# Patient Record
Sex: FEMALE | Race: WHITE | Employment: UNEMPLOYED
[De-identification: names, ages, dates, MRNs, and addresses within clinical notes are randomized per-mention and may not be internally consistent; named-entity substitution may affect disease eponyms.]

---

## 2017-05-25 ENCOUNTER — HOSPITAL ENCOUNTER (OUTPATIENT)
Dept: ULTRASOUND IMAGING | Facility: CLINIC | Age: 37
Discharge: HOME | End: 2017-05-25
Payer: MEDICAID

## 2017-05-25 DIAGNOSIS — Z34.81 ENCOUNTER FOR SUPERVISION OF OTHER NORMAL PREGNANCY IN FIRST TRIMESTER: ICD-10-CM

## 2017-05-25 DIAGNOSIS — Z34.01 ENCOUNTER FOR SUPERVISION OF NORMAL FIRST PREGNANCY IN FIRST TRIMESTER: ICD-10-CM

## 2017-05-25 PROCEDURE — 76817 TRANSVAGINAL US OBSTETRIC: CPT

## 2017-05-25 PROCEDURE — 76801 OB US < 14 WKS SINGLE FETUS: CPT

## 2019-02-19 ENCOUNTER — OFFICE VISIT (OUTPATIENT)
Dept: PRIMARY CARE CLINIC | Age: 39
End: 2019-02-19

## 2019-02-19 VITALS
HEART RATE: 92 BPM | BODY MASS INDEX: 41.15 KG/M2 | HEIGHT: 64 IN | SYSTOLIC BLOOD PRESSURE: 136 MMHG | WEIGHT: 241 LBS | OXYGEN SATURATION: 99 % | DIASTOLIC BLOOD PRESSURE: 78 MMHG

## 2019-02-19 DIAGNOSIS — Z13.31 POSITIVE DEPRESSION SCREENING: ICD-10-CM

## 2019-02-19 DIAGNOSIS — E28.2 PCO (POLYCYSTIC OVARIES): ICD-10-CM

## 2019-02-19 DIAGNOSIS — F32.1 CURRENT MODERATE EPISODE OF MAJOR DEPRESSIVE DISORDER WITHOUT PRIOR EPISODE (HCC): Primary | ICD-10-CM

## 2019-02-19 DIAGNOSIS — E66.01 CLASS 3 SEVERE OBESITY DUE TO EXCESS CALORIES WITHOUT SERIOUS COMORBIDITY WITH BODY MASS INDEX (BMI) OF 40.0 TO 44.9 IN ADULT (HCC): ICD-10-CM

## 2019-02-19 PROCEDURE — G8431 POS CLIN DEPRES SCRN F/U DOC: HCPCS | Performed by: INTERNAL MEDICINE

## 2019-02-19 PROCEDURE — G0444 DEPRESSION SCREEN ANNUAL: HCPCS | Performed by: INTERNAL MEDICINE

## 2019-02-19 PROCEDURE — 99204 OFFICE O/P NEW MOD 45 MIN: CPT | Performed by: INTERNAL MEDICINE

## 2019-02-19 RX ORDER — BUPROPION HYDROCHLORIDE 150 MG/1
150 TABLET ORAL EVERY MORNING
Qty: 30 TABLET | Refills: 0 | Status: SHIPPED | OUTPATIENT
Start: 2019-02-19 | End: 2019-03-19

## 2019-02-19 RX ORDER — METFORMIN HYDROCHLORIDE 500 MG/1
500 TABLET, EXTENDED RELEASE ORAL 2 TIMES DAILY WITH MEALS
Qty: 60 TABLET | Refills: 3 | Status: SHIPPED | OUTPATIENT
Start: 2019-02-19 | End: 2022-03-09

## 2019-02-19 ASSESSMENT — PATIENT HEALTH QUESTIONNAIRE - PHQ9
2. FEELING DOWN, DEPRESSED OR HOPELESS: 2
SUM OF ALL RESPONSES TO PHQ QUESTIONS 1-9: 12
4. FEELING TIRED OR HAVING LITTLE ENERGY: 1
1. LITTLE INTEREST OR PLEASURE IN DOING THINGS: 2
SUM OF ALL RESPONSES TO PHQ QUESTIONS 1-9: 12
9. THOUGHTS THAT YOU WOULD BE BETTER OFF DEAD, OR OF HURTING YOURSELF: 0
SUM OF ALL RESPONSES TO PHQ9 QUESTIONS 1 & 2: 4
10. IF YOU CHECKED OFF ANY PROBLEMS, HOW DIFFICULT HAVE THESE PROBLEMS MADE IT FOR YOU TO DO YOUR WORK, TAKE CARE OF THINGS AT HOME, OR GET ALONG WITH OTHER PEOPLE: 2
6. FEELING BAD ABOUT YOURSELF - OR THAT YOU ARE A FAILURE OR HAVE LET YOURSELF OR YOUR FAMILY DOWN: 2
7. TROUBLE CONCENTRATING ON THINGS, SUCH AS READING THE NEWSPAPER OR WATCHING TELEVISION: 2
3. TROUBLE FALLING OR STAYING ASLEEP: 1
8. MOVING OR SPEAKING SO SLOWLY THAT OTHER PEOPLE COULD HAVE NOTICED. OR THE OPPOSITE, BEING SO FIGETY OR RESTLESS THAT YOU HAVE BEEN MOVING AROUND A LOT MORE THAN USUAL: 1
5. POOR APPETITE OR OVEREATING: 1

## 2019-02-19 ASSESSMENT — ENCOUNTER SYMPTOMS
SINUS PAIN: 0
VOMITING: 0
BACK PAIN: 0
CONSTIPATION: 0
DIARRHEA: 0
SHORTNESS OF BREATH: 0
COUGH: 0
ABDOMINAL DISTENTION: 0
WHEEZING: 0
NAUSEA: 0
SINUS PRESSURE: 0
ABDOMINAL PAIN: 0

## 2019-03-19 ENCOUNTER — OFFICE VISIT (OUTPATIENT)
Dept: PRIMARY CARE CLINIC | Age: 39
End: 2019-03-19

## 2019-03-19 VITALS
BODY MASS INDEX: 40.05 KG/M2 | OXYGEN SATURATION: 99 % | HEART RATE: 93 BPM | SYSTOLIC BLOOD PRESSURE: 136 MMHG | WEIGHT: 234.6 LBS | DIASTOLIC BLOOD PRESSURE: 88 MMHG | HEIGHT: 64 IN

## 2019-03-19 DIAGNOSIS — Z13.31 POSITIVE DEPRESSION SCREENING: ICD-10-CM

## 2019-03-19 DIAGNOSIS — F32.1 CURRENT MODERATE EPISODE OF MAJOR DEPRESSIVE DISORDER WITHOUT PRIOR EPISODE (HCC): Primary | ICD-10-CM

## 2019-03-19 PROCEDURE — 99203 OFFICE O/P NEW LOW 30 MIN: CPT | Performed by: INTERNAL MEDICINE

## 2019-03-19 RX ORDER — BUPROPION HYDROCHLORIDE 300 MG/1
300 TABLET ORAL EVERY MORNING
Qty: 30 TABLET | Refills: 0 | Status: SHIPPED | OUTPATIENT
Start: 2019-03-19 | End: 2019-04-17 | Stop reason: SDUPTHER

## 2019-03-19 ASSESSMENT — PATIENT HEALTH QUESTIONNAIRE - PHQ9
SUM OF ALL RESPONSES TO PHQ QUESTIONS 1-9: 1
1. LITTLE INTEREST OR PLEASURE IN DOING THINGS: 0
SUM OF ALL RESPONSES TO PHQ9 QUESTIONS 1 & 2: 1
SUM OF ALL RESPONSES TO PHQ QUESTIONS 1-9: 1
2. FEELING DOWN, DEPRESSED OR HOPELESS: 1

## 2019-03-20 ASSESSMENT — ENCOUNTER SYMPTOMS
SINUS PAIN: 0
SHORTNESS OF BREATH: 0
BACK PAIN: 0
CONSTIPATION: 0
VOMITING: 0
ABDOMINAL DISTENTION: 0
COUGH: 0
ABDOMINAL PAIN: 0
DIARRHEA: 0
WHEEZING: 0
NAUSEA: 0
SINUS PRESSURE: 0

## 2019-04-17 DIAGNOSIS — F32.1 CURRENT MODERATE EPISODE OF MAJOR DEPRESSIVE DISORDER WITHOUT PRIOR EPISODE (HCC): ICD-10-CM

## 2019-04-18 RX ORDER — BUPROPION HYDROCHLORIDE 300 MG/1
TABLET ORAL
Qty: 30 TABLET | Refills: 5 | Status: SHIPPED | OUTPATIENT
Start: 2019-04-18 | End: 2019-11-02 | Stop reason: SDUPTHER

## 2019-09-16 ENCOUNTER — TELEPHONE (OUTPATIENT)
Dept: PRIMARY CARE CLINIC | Age: 39
End: 2019-09-16

## 2019-10-03 ENCOUNTER — OFFICE VISIT (OUTPATIENT)
Dept: PRIMARY CARE CLINIC | Age: 39
End: 2019-10-03

## 2019-10-03 VITALS
DIASTOLIC BLOOD PRESSURE: 70 MMHG | WEIGHT: 232.8 LBS | OXYGEN SATURATION: 99 % | SYSTOLIC BLOOD PRESSURE: 140 MMHG | BODY MASS INDEX: 39.96 KG/M2 | HEART RATE: 89 BPM | RESPIRATION RATE: 16 BRPM

## 2019-10-03 DIAGNOSIS — F41.1 GENERALIZED ANXIETY DISORDER: ICD-10-CM

## 2019-10-03 DIAGNOSIS — F32.1 CURRENT MODERATE EPISODE OF MAJOR DEPRESSIVE DISORDER WITHOUT PRIOR EPISODE (HCC): ICD-10-CM

## 2019-10-03 DIAGNOSIS — I10 ESSENTIAL HYPERTENSION: Primary | ICD-10-CM

## 2019-10-03 DIAGNOSIS — E66.9 CLASS 2 OBESITY WITHOUT SERIOUS COMORBIDITY WITH BODY MASS INDEX (BMI) OF 39.0 TO 39.9 IN ADULT, UNSPECIFIED OBESITY TYPE: ICD-10-CM

## 2019-10-03 PROBLEM — E66.812 CLASS 2 OBESITY WITHOUT SERIOUS COMORBIDITY WITH BODY MASS INDEX (BMI) OF 39.0 TO 39.9 IN ADULT: Status: ACTIVE | Noted: 2019-10-03

## 2019-10-03 PROCEDURE — 99214 OFFICE O/P EST MOD 30 MIN: CPT | Performed by: INTERNAL MEDICINE

## 2019-10-03 RX ORDER — AMLODIPINE BESYLATE 5 MG/1
5 TABLET ORAL DAILY
Qty: 30 TABLET | Refills: 1 | Status: SHIPPED | OUTPATIENT
Start: 2019-10-03 | End: 2019-12-02 | Stop reason: SDUPTHER

## 2019-10-03 ASSESSMENT — ENCOUNTER SYMPTOMS
WHEEZING: 0
DIARRHEA: 0
VOMITING: 0
CONSTIPATION: 0
SINUS PRESSURE: 0
ABDOMINAL DISTENTION: 0
SINUS PAIN: 0
NAUSEA: 0
SHORTNESS OF BREATH: 0
BACK PAIN: 0
ABDOMINAL PAIN: 0
COUGH: 0

## 2019-11-02 DIAGNOSIS — F32.1 CURRENT MODERATE EPISODE OF MAJOR DEPRESSIVE DISORDER WITHOUT PRIOR EPISODE (HCC): ICD-10-CM

## 2019-11-05 RX ORDER — BUPROPION HYDROCHLORIDE 300 MG/1
TABLET ORAL
Qty: 90 TABLET | Refills: 1 | Status: SHIPPED | OUTPATIENT
Start: 2019-11-05 | End: 2020-05-15

## 2019-12-02 DIAGNOSIS — I10 ESSENTIAL HYPERTENSION: ICD-10-CM

## 2019-12-03 RX ORDER — AMLODIPINE BESYLATE 5 MG/1
TABLET ORAL
Qty: 90 TABLET | Refills: 1 | Status: SHIPPED | OUTPATIENT
Start: 2019-12-03 | End: 2020-07-02

## 2020-05-15 RX ORDER — BUPROPION HYDROCHLORIDE 300 MG/1
TABLET ORAL
Qty: 90 TABLET | Refills: 0 | Status: SHIPPED | OUTPATIENT
Start: 2020-05-15 | End: 2020-08-20

## 2020-07-02 RX ORDER — AMLODIPINE BESYLATE 5 MG/1
TABLET ORAL
Qty: 90 TABLET | Refills: 0 | Status: SHIPPED | OUTPATIENT
Start: 2020-07-02 | End: 2020-11-13

## 2020-07-02 NOTE — TELEPHONE ENCOUNTER
Health Maintenance   Topic Date Due    Potassium monitoring  1980    Creatinine monitoring  1980    Varicella vaccine (1 of 2 - 2-dose childhood series) 01/20/1981    HIV screen  01/20/1995    Cervical cancer screen  01/20/2001    Lipid screen  01/20/2020    Flu vaccine (1) 09/01/2020    DTaP/Tdap/Td vaccine (2 - Td) 04/19/2026    Hepatitis A vaccine  Aged Out    Hepatitis B vaccine  Aged Out    Hib vaccine  Aged Out    Meningococcal (ACWY) vaccine  Aged Out    Pneumococcal 0-64 years Vaccine  Aged Out             (applicable per patient's age: Cancer Screenings, Depression Screening, Fall Risk Screening, Immunizations)    No results found for: LABA1C, LABMICR, LDLCHOLESTEROL, LDLCALC, AST, ALT, BUN   (goal A1C is < 7)   (goal LDL is <100) need 30-50% reduction from baseline     BP Readings from Last 3 Encounters:   10/03/19 (!) 140/70   03/19/19 136/88   02/19/19 136/78    (goal /80)      All Future Testing planned in [facility]:      Next Visit Date:  No future appointments.      Last Visit: 10/3/2019    Patient Active Problem List:     PCO (polycystic ovaries)     Insulin resistance     Current moderate episode of major depressive disorder without prior episode (HCC)     Class 2 obesity without serious comorbidity with body mass index (BMI) of 39.0 to 39.9 in adult     Generalized anxiety disorder     Essential hypertension

## 2020-08-20 RX ORDER — BUPROPION HYDROCHLORIDE 300 MG/1
TABLET ORAL
Qty: 90 TABLET | Refills: 0 | Status: SHIPPED | OUTPATIENT
Start: 2020-08-20 | End: 2020-11-13

## 2020-08-20 NOTE — TELEPHONE ENCOUNTER
Last OV 10/03/2019      Health Maintenance   Topic Date Due    Potassium monitoring  1980    Creatinine monitoring  1980    Varicella vaccine (1 of 2 - 2-dose childhood series) 01/20/1981    HIV screen  01/20/1995    Cervical cancer screen  01/20/2001    Lipid screen  01/20/2020    Flu vaccine (1) 09/01/2020    DTaP/Tdap/Td vaccine (2 - Td) 04/19/2026    Hepatitis A vaccine  Aged Out    Hepatitis B vaccine  Aged Out    Hib vaccine  Aged Out    Meningococcal (ACWY) vaccine  Aged Out    Pneumococcal 0-64 years Vaccine  Aged Out             (applicable per patient's age: Cancer Screenings, Depression Screening, Fall Risk Screening, Immunizations)    No results found for: LABA1C, LABMICR, LDLCHOLESTEROL, LDLCALC, AST, ALT, BUN   (goal A1C is < 7)   (goal LDL is <100) need 30-50% reduction from baseline     BP Readings from Last 3 Encounters:   10/03/19 (!) 140/70   03/19/19 136/88   02/19/19 136/78    (goal /80)      All Future Testing planned in [facility]:      Next Visit Date:  No future appointments.          Patient Active Problem List:     PCO (polycystic ovaries)     Insulin resistance     Current moderate episode of major depressive disorder without prior episode (HCC)     Class 2 obesity without serious comorbidity with body mass index (BMI) of 39.0 to 39.9 in adult     Generalized anxiety disorder     Essential hypertension

## 2020-11-13 RX ORDER — AMLODIPINE BESYLATE 5 MG/1
TABLET ORAL
Qty: 90 TABLET | Refills: 0 | Status: SHIPPED | OUTPATIENT
Start: 2020-11-13 | End: 2022-03-09

## 2020-11-13 RX ORDER — BUPROPION HYDROCHLORIDE 300 MG/1
TABLET ORAL
Qty: 90 TABLET | Refills: 0 | Status: SHIPPED | OUTPATIENT
Start: 2020-11-13 | End: 2022-03-09

## 2022-03-05 ENCOUNTER — APPOINTMENT (OUTPATIENT)
Dept: GENERAL RADIOLOGY | Age: 42
End: 2022-03-05

## 2022-03-05 ENCOUNTER — HOSPITAL ENCOUNTER (EMERGENCY)
Age: 42
Discharge: HOME | End: 2022-03-05
Attending: EMERGENCY MEDICINE

## 2022-03-05 VITALS
HEART RATE: 87 BPM | SYSTOLIC BLOOD PRESSURE: 140 MMHG | WEIGHT: 240 LBS | OXYGEN SATURATION: 99 % | RESPIRATION RATE: 18 BRPM | HEIGHT: 64 IN | DIASTOLIC BLOOD PRESSURE: 91 MMHG | TEMPERATURE: 98.5 F | BODY MASS INDEX: 40.97 KG/M2

## 2022-03-05 DIAGNOSIS — I10 HYPERTENSION, UNSPECIFIED TYPE: ICD-10-CM

## 2022-03-05 DIAGNOSIS — R55 NEAR SYNCOPE: Primary | ICD-10-CM

## 2022-03-05 DIAGNOSIS — R53.83 FATIGUE, UNSPECIFIED TYPE: ICD-10-CM

## 2022-03-05 LAB
ABSOLUTE EOS #: 0.2 K/UL (ref 0–0.4)
ABSOLUTE LYMPH #: 1.7 K/UL (ref 1–4.8)
ABSOLUTE MONO #: 0.5 K/UL (ref 0.1–1.2)
ANION GAP SERPL CALCULATED.3IONS-SCNC: 12 MMOL/L (ref 9–17)
BASOPHILS # BLD: 1 % (ref 0–2)
BASOPHILS ABSOLUTE: 0 K/UL (ref 0–0.2)
BUN BLDV-MCNC: 11 MG/DL (ref 6–20)
CALCIUM SERPL-MCNC: 9.9 MG/DL (ref 8.6–10.4)
CHLORIDE BLD-SCNC: 97 MMOL/L (ref 98–107)
CHP ED QC CHECK: YES
CO2: 25 MMOL/L (ref 20–31)
CREAT SERPL-MCNC: 0.59 MG/DL (ref 0.5–0.9)
EOSINOPHILS RELATIVE PERCENT: 3 % (ref 1–4)
GFR AFRICAN AMERICAN: >60 ML/MIN
GFR NON-AFRICAN AMERICAN: >60 ML/MIN
GFR SERPL CREATININE-BSD FRML MDRD: ABNORMAL ML/MIN/{1.73_M2}
GLUCOSE BLD-MCNC: 75 MG/DL
GLUCOSE BLD-MCNC: 75 MG/DL (ref 65–105)
GLUCOSE BLD-MCNC: 91 MG/DL (ref 70–99)
HCG QUALITATIVE: NEGATIVE
HCT VFR BLD CALC: 40.3 % (ref 36–46)
HEMOGLOBIN: 13.6 G/DL (ref 12–16)
LYMPHOCYTES # BLD: 24 % (ref 24–44)
MAGNESIUM: 1.9 MG/DL (ref 1.6–2.6)
MCH RBC QN AUTO: 28.2 PG (ref 26–34)
MCHC RBC AUTO-ENTMCNC: 33.6 G/DL (ref 31–37)
MCV RBC AUTO: 83.9 FL (ref 80–100)
MONOCYTES # BLD: 7 % (ref 2–11)
PDW BLD-RTO: 13.2 % (ref 12.5–15.4)
PLATELET # BLD: 163 K/UL (ref 140–450)
PMV BLD AUTO: 10.9 FL (ref 6–12)
POTASSIUM SERPL-SCNC: 4.1 MMOL/L (ref 3.7–5.3)
RBC # BLD: 4.81 M/UL (ref 4–5.2)
SEG NEUTROPHILS: 65 % (ref 36–66)
SEGMENTED NEUTROPHILS ABSOLUTE COUNT: 4.6 K/UL (ref 1.8–7.7)
SODIUM BLD-SCNC: 134 MMOL/L (ref 135–144)
TROPONIN, HIGH SENSITIVITY: 6 NG/L (ref 0–14)
TROPONIN, HIGH SENSITIVITY: 7 NG/L (ref 0–14)
WBC # BLD: 7 K/UL (ref 3.5–11)

## 2022-03-05 PROCEDURE — 93005 ELECTROCARDIOGRAM TRACING: CPT | Performed by: PHYSICIAN ASSISTANT

## 2022-03-05 PROCEDURE — 71045 X-RAY EXAM CHEST 1 VIEW: CPT

## 2022-03-05 PROCEDURE — 85025 COMPLETE CBC W/AUTO DIFF WBC: CPT

## 2022-03-05 PROCEDURE — 96374 THER/PROPH/DIAG INJ IV PUSH: CPT

## 2022-03-05 PROCEDURE — 99285 EMERGENCY DEPT VISIT HI MDM: CPT

## 2022-03-05 PROCEDURE — 2580000003 HC RX 258: Performed by: PHYSICIAN ASSISTANT

## 2022-03-05 PROCEDURE — 82947 ASSAY GLUCOSE BLOOD QUANT: CPT

## 2022-03-05 PROCEDURE — 36415 COLL VENOUS BLD VENIPUNCTURE: CPT

## 2022-03-05 PROCEDURE — 84703 CHORIONIC GONADOTROPIN ASSAY: CPT

## 2022-03-05 PROCEDURE — 6360000002 HC RX W HCPCS: Performed by: PHYSICIAN ASSISTANT

## 2022-03-05 PROCEDURE — 96361 HYDRATE IV INFUSION ADD-ON: CPT

## 2022-03-05 PROCEDURE — 83735 ASSAY OF MAGNESIUM: CPT

## 2022-03-05 PROCEDURE — 80048 BASIC METABOLIC PNL TOTAL CA: CPT

## 2022-03-05 PROCEDURE — 84484 ASSAY OF TROPONIN QUANT: CPT

## 2022-03-05 RX ORDER — ONDANSETRON 2 MG/ML
4 INJECTION INTRAMUSCULAR; INTRAVENOUS ONCE
Status: COMPLETED | OUTPATIENT
Start: 2022-03-05 | End: 2022-03-05

## 2022-03-05 RX ORDER — 0.9 % SODIUM CHLORIDE 0.9 %
1000 INTRAVENOUS SOLUTION INTRAVENOUS ONCE
Status: COMPLETED | OUTPATIENT
Start: 2022-03-05 | End: 2022-03-05

## 2022-03-05 RX ADMIN — SODIUM CHLORIDE 1000 ML: 9 INJECTION, SOLUTION INTRAVENOUS at 13:28

## 2022-03-05 RX ADMIN — ONDANSETRON 4 MG: 2 INJECTION INTRAMUSCULAR; INTRAVENOUS at 13:29

## 2022-03-05 ASSESSMENT — PAIN SCALES - GENERAL: PAINLEVEL_OUTOF10: 4

## 2022-03-05 ASSESSMENT — PAIN - FUNCTIONAL ASSESSMENT: PAIN_FUNCTIONAL_ASSESSMENT: 0-10

## 2022-03-05 NOTE — ED PROVIDER NOTES
[id number]  [address] [facility] [address]  Phone: [phone]  Fax: [phone]      Attending Physician [address]       Chief Complaint   Patient presents with    Fatigue       DIAGNOSTIC RESULTS     LABS:  Labs Reviewed   BASIC METABOLIC PANEL - Abnormal; Notable for the following components:       Result Value    Sodium 134 (*)     Chloride 97 (*)     All other components within normal limits   POCT GLUCOSE - Normal   CBC WITH AUTO DIFFERENTIAL   MAGNESIUM   TROPONIN   HCG, SERUM, QUALITATIVE   POC GLUCOSE FINGERSTICK       All other labs were within normal range or not returned as of this dictation. RADIOLOGY:  XR CHEST PORTABLE    (Results Pending)         EMERGENCY DEPARTMENT COURSE:   Vitals:    Vitals:    03/05/22 1254   BP: (!) 179/96   Pulse: 101   Resp: 18   Temp: 98.5 °F (36.9 °C)   TempSrc: Oral   SpO2: 100%   Weight: 108.9 kg (240 lb)   Height: 5' 4\" (1.626 m)     -------------------------  BP: (!) 179/96, Temp: 98.5 °F (36.9 °C), Pulse: 101, Resp: 18             PERTINENT ATTENDING PHYSICIAN COMMENTS:    I performed a history and physical examination of the patient and discussed management with the mid level provider. I reviewed the mid level provider's note and agree with the documented findings and plan of care. Any areas of disagreement are noted on the chart. I was personally present for the key portions of any procedures. I have documented in the chart those procedures where I was not present during the key portions. I have reviewed the emergency nurses triage note. I agree with the chief complaint, past medical history, past surgical history, allergies, medications, social and family history as documented unless otherwise noted below. Documentation of the HPI, Physical Exam and Medical Decision Making performed by mid level providers is based on my personal performance of the HPI, PE and MDM.  For Physician Assistant/ Nurse Practitioner cases/documentation I have personally evaluated this patient and have completed at least one if not all key elements of the E/M (history, physical exam, and MDM). Additional findings are as noted.          (Please note that portions of this note were completed with a voice recognition program.  Efforts were made to edit the dictations but occasionally words are mis-transcribed.)    Justice Willson DO  Attending Emergency Medicine Physician       Justice Willson DO  03/05/22 3876

## 2022-03-05 NOTE — ED NOTES
AVS reviewed with the patient. Discussed follow-up care, taking medications as prescribed, and when to call 911. VSS. All questions answered. IV Removed per order. Ambulatory out of the department with a steady, unassisted gait.         Olga Lidia Tong RN  03/05/22 8731

## 2022-03-05 NOTE — ED TRIAGE NOTES
Patient arrives with c/o lethargy and heart fluttering. Pt states she feels like she is in a [place] fog\". States her heart has felt like it is fast and fluttering. Glucose on arrival 75.

## 2022-03-05 NOTE — ED PROVIDER NOTES
[id number] [facility] ED  [address] [facility] [address]  Phone: [phone]  Fax: [phone]        Pt Name: Felicia Malik  MRN: [id number]  [place] 1980  Date of evaluation: 3/5/22    [address] [place] [address]       Chief Complaint   Patient presents with    Fatigue       HISTORY OF PRESENT ILLNESS (Location/Symptom, Timing/Onset, Context/Setting, Quality, Duration, Modifying Factors, Severity)      Felicia Malik is a 43 y.o. female with no pertinent PMH who presents to the ED via private auto with fatigue and palps. Patient reports that since a few hours PTA she has been experiencing generalized fatigue and had a few \"flutters\" in her heart. Denies CP/SOB. No cardiac history. Denies history of blood clots, clotting disorders, or malignancy. Denies recent trauma, surgery, or extended travel. Denies hemoptysis, calf pain, or leg swelling. No use of hormones. Reports that she is feeling \"brain fog\" that has been ongoing since she awoke. Does note of poor sleep and drinks 2 cups of coffee which is normal for her. Hasn't had much to drink. Denies any exac allev factors. Denies taking any meds. Felt like she was going to pass out PTA which is what brought her in. Denies any fever, chills, N/V/D, abdominal pain, HA, vision changes, unilateral weakness, or any other concerns at this time. PAST MEDICAL / SURGICAL / SOCIAL / FAMILY HISTORY     PMH:  has a past medical history of Insulin resistance and PCO (polycystic ovaries). Surgical History:  has a past surgical history that includes Cholecystectomy;  section; Dilation and curettage of uterus; and Tubal ligation. Social History:  reports that she has never smoked. She has never used smokeless tobacco. She reports that she does not drink alcohol and does not use drugs. Family History: She indicated that her mother is alive. She indicated that her father is alive. She indicated that the status of her paternal grandmother is unknown.  She indicated that the status of her paternal grandfather is unknown.   family history includes Diabetes in her paternal grandfather; Hypertension in her paternal grandfather; Thyroid Disease in her paternal grandmother. Psychiatric History: None    Allergies: Nsaids and Tolmetin    Home Medications:   Prior to Admission medications    Medication Sig Start Date End Date Taking? Authorizing Provider   buPROPion (WELLBUTRIN XL) 300 MG extended release tablet TAKE 1 TABLET BY MOUTH ONCE DAILY IN THE MORNING 11/13/20   Aime Paul MD   amLODIPine (NORVASC) 5 MG tablet Take 1 tablet by mouth once daily 11/13/20   Aime Paul MD   metFORMIN (GLUCOPHAGE-XR) 500 MG extended release tablet Take 1 tablet by mouth 2 times daily (with meals) 2/19/19   Aime Paul MD   Respiratory Therapy Supplies (NEBULIZER COMPRESSOR) KIT 1 kit by Does not apply route once for 1 dose. 12/30/14 10/3/19  Clair Evans MD   albuterol (PROVENTIL) (2.5 MG/3ML) 0.083% nebulizer solution Take 3 mLs by nebulization every 6 hours as needed for Wheezing. 12/30/14   Clair Evans MD       REVIEW OF SYSTEMS  (2-9 systems for level 4, 10 ormore for level 5)      Review of Systems    Constitutional: See HPI. Denies fever or chills. Eyes: Denies vision changes. HENT: Denies sore throat or neck pain. Respiratory: Denies cough or shortness of breath. Cardiovascular: Denies chest pain. GI: Denies vomiting or diarrhea. : Denies painful urination. Musculoskeletal: Denies recent trauma. Skin: Denies new rashes or wounds. Neurologic:  Denies new numbness or weakness. Psychiatric: Denies sleep disturbances. Endocrine:  Denies unexpected weight loss  Heme: Denies bleeding disorders. All other systems negative except as marked. PHYSICAL EXAM  (up to 7 for level 4, 8 or more for level 5)      INITIAL VITALS:  height is 5' 4\" (1.626 m) and weight is 108.9 kg (240 lb). Her oral temperature is 98.5 °F (36.9 °C). Her blood pressure is 140/91 (abnormal) and her pulse is 87. Her respiration is 18 and oxygen saturation is 99%. Vital signs reviewed. Physical Exam    General:  Alert, cooperative, well-groomed, well-nourished, appears stated age, and is in no acute distress. Head:  Normocephalic, atraumatic, and without obvious abnormality. Eyes:  Sclerae/conjunctivae clear without injection, pallor, or icterus. Corneas clear without opacities. EOM's intact. ENT: Ears and nose are all without obvious masses lesion or deformity. No oropharynx examination performed due to aerosolization risk during COVID-19 pandemic. Neck: Supple and symmetrical. Trachea midline. No adenopathy. No jugular venous distention. Lungs:   No respiratory distress. Clear to auscultation bilaterally. No wheezes, rhonchi, or rales. Heart:  Regular rate. Regular rhythm. No murmurs, rubs, or gallops. Abdomen:   Normoactive bowel sounds. Soft, nontender, nondistended without guarding or rebound. No palpable masses. No CVA tenderness. Extremities: Warm and dry without erythema or edema. Skin: Soft, good turgor, and well-hydrated. No obvious rashes or lesions. Neurologic: GCS is 15 and no focal deficits are appreciated. Normal gait. Grossly normal motor and sensation. Speech clear. Psychiatric: Normal mood and affect. Normal behavior. Coherent thought process. DIFFERENTIAL DIAGNOSIS / MDM     Pt presents to the ED with the complaints as described above. VS are unremarkable - was borderline tachy on arrival but quickly resolved upon my arrival and exam. PERC negative due to lack of persistent tachycardia. PE demonstrates an anxious appearing but otherwise well-appearing female in no acute distress. Lungs CTA. HRRR. Abdomen soft and nontender. Will obtain cardiac work-up and reassess. Will give fluids and some zofran as she mentions some mild nausea later.     PLAN (LABS / IMAGING / EKG):  Orders Placed This Encounter   Procedures  XR CHEST PORTABLE    CBC with Auto Differential    Magnesium    Basic Metabolic Panel    Troponin    HCG Qualitative, Serum    Troponin    POCT Glucose    POC Glucose Fingerstick    EKG 12 Lead    Insert peripheral IV       MEDICATIONS ORDERED:  Orders Placed This Encounter   Medications    0.9 % sodium chloride bolus    ondansetron (ZOFRAN) injection 4 mg       Controlled Substances Monitoring:     DIAGNOSTIC RESULTS     EKG: All EKG's are interpreted by the Emergency Department Physician who either signs or Co-signs this chart in the absenceof a cardiologist.    EKG INTERPRETATION:    Interpreted by attending emergency department physician    Rhythm: normal sinus   Rate: normal  Axis: normal  Ectopy: none  Conduction: normal  ST Segments: no acute change  T Waves: no acute change  Q Waves: none    Clinical Impression: Normal sinus EKG with no acute ischemia or infarction noted. RADIOLOGY: All images are read by the radiologist and their interpretations are reviewed. XR CHEST PORTABLE    Result Date: 3/5/2022  EXAMINATION: ONE XRAY VIEW OF THE CHEST 3/5/2022 1:37 pm COMPARISON: Chest radiograph performed 12/30/2014. HISTORY: ORDERING SYSTEM PROVIDED HISTORY: Lightheaded TECHNOLOGIST PROVIDED HISTORY: Lightheaded Reason for Exam: heart fluttering Additional signs and symptoms: fatigue Relevant Medical/Surgical History: hx PCOS FINDINGS: There are subtle right basilar infiltrates. There is no effusion. There is no pneumothorax. The mediastinal structures are unremarkable. The upper abdomen unremarkable. The extrathoracic soft tissues are unremarkable. Subtle right basilar infiltrate representing atelectasis versus pneumonia.        LABS:  Results for orders placed or performed during the hospital encounter of 03/05/22   CBC with Auto Differential   Result Value Ref Range    WBC 7.0 3.5 - 11.0 k/uL    RBC 4.81 4.0 - 5.2 m/uL    Hemoglobin 13.6 12.0 - 16.0 g/dL    Hematocrit 40.3 36 - 46 % MCV 83.9 80 - 100 fL    MCH 28.2 26 - 34 pg    MCHC 33.6 31 - 37 g/dL    RDW 13.2 12.5 - 15.4 %    Platelets 232 764 - 161 k/uL    MPV 10.9 6.0 - 12.0 fL    Seg Neutrophils 65 36 - 66 %    Lymphocytes 24 24 - 44 %    Monocytes 7 2 - 11 %    Eosinophils % 3 1 - 4 %    Basophils 1 0 - 2 %    Segs Absolute 4.60 1.8 - 7.7 k/uL    Absolute Lymph # 1.70 1.0 - 4.8 k/uL    Absolute Mono # 0.50 0.1 - 1.2 k/uL    Absolute Eos # 0.20 0.0 - 0.4 k/uL    Basophils Absolute 0.00 0.0 - 0.2 k/uL   Magnesium   Result Value Ref Range    Magnesium 1.9 1.6 - 2.6 mg/dL   Basic Metabolic Panel   Result Value Ref Range    Glucose 91 70 - 99 mg/dL    BUN 11 6 - 20 mg/dL    CREATININE 0.59 0.50 - 0.90 mg/dL    Calcium 9.9 8.6 - 10.4 mg/dL    Sodium 134 (L) 135 - 144 mmol/L    Potassium 4.1 3.7 - 5.3 mmol/L    Chloride 97 (L) 98 - 107 mmol/L    CO2 25 20 - 31 mmol/L    Anion Gap 12 9 - 17 mmol/L    GFR Non-African American >60 >60 mL/min    GFR African American >60 >60 mL/min    GFR Comment         Troponin   Result Value Ref Range    Troponin, High Sensitivity 6 0 - 14 ng/L   HCG Qualitative, Serum   Result Value Ref Range    hCG Qual NEGATIVE NEGATIVE   Troponin   Result Value Ref Range    Troponin, High Sensitivity 7 0 - 14 ng/L   POCT Glucose   Result Value Ref Range    Glucose 75 mg/dL    QC OK?  yes    POC Glucose Fingerstick   Result Value Ref Range    POC Glucose 75 65 - 105 mg/dL       EMERGENCY DEPARTMENT COURSE     ED Course as of 03/05/22 1811   Sat Mar 05, 2022   1340 Lab work is all unremarkable [MG]      ED Course User Index  [MG] Devika Melendez PA-C        Vitals:    Vitals:    03/05/22 1254 03/05/22 1424 03/05/22 1629   BP: (!) 179/96 (!) 169/86 (!) 140/91   Pulse: 101 93 87   Resp: 18 18 18   Temp: 98.5 °F (36.9 °C)     TempSrc: Oral     SpO2: 100% 98% 99%   Weight: 108.9 kg (240 lb)     Height: 5' 4\" (1.626 m)       -------------------------  BP: (!) 140/91, Temp: 98.5 °F (36.9 °C), Pulse: 87, Resp: 18      RE-EVALUATION:  See ED Course notes above. CXR negative. Patient updated. Feeling better. Low susp for emergent etiology at this time. Advised importance of monitoring caffeine and hydration and follow-up with PCP. The patient and/or family and I have discussed the diagnosis and risks, and we agree with discharging home to follow-up with their pertinent providers. The patient appears stable for discharge and has been instructed to return immediately for new concerning symptoms or if the symptoms worsen in any way. The patient understands that at this time there is no evidence for a more malignant underlying process, but the patient also understands that early in the process of an illness or injury, an emergency department workup can be falsely reassuring. Routine discharge counseling was given, and the patient understands that worsening, changing or persistent symptoms should prompt an immediate call or follow up with their primary physician or return to the emergency department. I have reviewed the disposition diagnosis with the patient and or their family/guardian. I have answered their questions and given discharge instructions. They voiced understanding of these instructions and did not have any further questions or complaints. This patient was seen by the attending physician and they agreed with the assessment and plan. CONSULTS:  None    PROCEDURES:  None    FINAL IMPRESSION      1. Near syncope    2. Fatigue, unspecified type    3. Hypertension, unspecified type          DISPOSITION / PLAN     CONDITION ON DISPOSITION:   Good / Stable for discharge.      PATIENT REFERRED TO:  Florence Tate MD  [place] [address]  [phone]    Call in 2 days        DISCHARGE MEDICATIONS:  Discharge Medication List as of 3/5/2022  4:06 PM          [place]   Emergency Medicine Physician Assistant    (Please note that portions of this note were completed with a voice recognition program.  Efforts were made to edit the dictations but occasionally words aremis-transcribed.)        Jordy Loza PA-C  03/11/22 1059

## 2022-03-07 LAB
EKG ATRIAL RATE: 94 BPM
EKG P AXIS: 40 DEGREES
EKG P-R INTERVAL: 146 MS
EKG Q-T INTERVAL: 362 MS
EKG QRS DURATION: 84 MS
EKG QTC CALCULATION (BAZETT): 452 MS
EKG R AXIS: 36 DEGREES
EKG T AXIS: 18 DEGREES
EKG VENTRICULAR RATE: 94 BPM

## 2022-03-08 SDOH — HEALTH STABILITY: PHYSICAL HEALTH: ON AVERAGE, HOW MANY MINUTES DO YOU ENGAGE IN EXERCISE AT THIS LEVEL?: 40 MIN

## 2022-03-08 SDOH — HEALTH STABILITY: PHYSICAL HEALTH: ON AVERAGE, HOW MANY DAYS PER WEEK DO YOU ENGAGE IN MODERATE TO STRENUOUS EXERCISE (LIKE A BRISK WALK)?: 3 DAYS

## 2022-03-09 ENCOUNTER — OFFICE VISIT (OUTPATIENT)
Dept: PRIMARY CARE CLINIC | Age: 42
End: 2022-03-09

## 2022-03-09 VITALS
OXYGEN SATURATION: 99 % | HEIGHT: 64 IN | RESPIRATION RATE: 18 BRPM | WEIGHT: 253 LBS | SYSTOLIC BLOOD PRESSURE: 144 MMHG | DIASTOLIC BLOOD PRESSURE: 82 MMHG | HEART RATE: 98 BPM | BODY MASS INDEX: 43.19 KG/M2

## 2022-03-09 DIAGNOSIS — I10 ESSENTIAL HYPERTENSION: Primary | ICD-10-CM

## 2022-03-09 DIAGNOSIS — Z13.6 SCREENING FOR CARDIOVASCULAR CONDITION: ICD-10-CM

## 2022-03-09 DIAGNOSIS — Z13.0 SCREENING, ANEMIA, DEFICIENCY, IRON: ICD-10-CM

## 2022-03-09 DIAGNOSIS — F41.9 ANXIETY: ICD-10-CM

## 2022-03-09 DIAGNOSIS — Z13.1 SCREENING FOR DIABETES MELLITUS: ICD-10-CM

## 2022-03-09 DIAGNOSIS — R00.2 PALPITATION: ICD-10-CM

## 2022-03-09 PROBLEM — F32.1 CURRENT MODERATE EPISODE OF MAJOR DEPRESSIVE DISORDER WITHOUT PRIOR EPISODE (HCC): Status: RESOLVED | Noted: 2019-02-19 | Resolved: 2022-03-09

## 2022-03-09 PROCEDURE — 99213 OFFICE O/P EST LOW 20 MIN: CPT | Performed by: NURSE PRACTITIONER

## 2022-03-09 RX ORDER — LOSARTAN POTASSIUM 25 MG/1
25 TABLET ORAL DAILY
Qty: 30 TABLET | Refills: 3 | Status: SHIPPED | OUTPATIENT
Start: 2022-03-09 | End: 2022-07-05

## 2022-03-09 RX ORDER — SERTRALINE HYDROCHLORIDE 25 MG/1
25 TABLET, FILM COATED ORAL DAILY
Qty: 30 TABLET | Refills: 3 | Status: SHIPPED | OUTPATIENT
Start: 2022-03-09 | End: 2022-07-05

## 2022-03-09 SDOH — ECONOMIC STABILITY: FOOD INSECURITY: WITHIN THE PAST 12 MONTHS, THE FOOD YOU BOUGHT JUST DIDN'T LAST AND YOU DIDN'T HAVE MONEY TO GET MORE.: NEVER TRUE

## 2022-03-09 SDOH — ECONOMIC STABILITY: FOOD INSECURITY: WITHIN THE PAST 12 MONTHS, YOU WORRIED THAT YOUR FOOD WOULD RUN OUT BEFORE YOU GOT MONEY TO BUY MORE.: NEVER TRUE

## 2022-03-09 ASSESSMENT — SOCIAL DETERMINANTS OF HEALTH (SDOH): HOW HARD IS IT FOR YOU TO PAY FOR THE VERY BASICS LIKE FOOD, HOUSING, MEDICAL CARE, AND HEATING?: NOT HARD AT ALL

## 2022-03-09 ASSESSMENT — ENCOUNTER SYMPTOMS
CHEST TIGHTNESS: 0
ABDOMINAL PAIN: 0
DIARRHEA: 0
SHORTNESS OF BREATH: 0
COLOR CHANGE: 0
RHINORRHEA: 0
VOMITING: 0
NAUSEA: 0
SORE THROAT: 0

## 2022-03-09 ASSESSMENT — PATIENT HEALTH QUESTIONNAIRE - PHQ9
SUM OF ALL RESPONSES TO PHQ QUESTIONS 1-9: 0
1. LITTLE INTEREST OR PLEASURE IN DOING THINGS: 0
2. FEELING DOWN, DEPRESSED OR HOPELESS: 0
SUM OF ALL RESPONSES TO PHQ9 QUESTIONS 1 & 2: 0
SUM OF ALL RESPONSES TO PHQ QUESTIONS 1-9: 0

## 2022-03-09 NOTE — PATIENT INSTRUCTIONS
Patient Education        Low Sodium Diet (2,000 Milligram): Care Instructions  Overview     Limiting sodium can be an important part of managing some health problems. The most common source of sodium is salt. People get most of the salt in their diet from canned, prepared, and packaged foods. Fast food and restaurant meals also are very high in sodium. Your doctor will probably limit your sodium to less than 2,000 milligrams (mg) a day. This limit counts all the sodium in prepared and packaged foods and any salt you add to your food. Follow-up care is a key part of your treatment and safety. Be sure to make and go to all appointments, and call your doctor if you are having problems. It's also a good idea to know your test results and keep a list of the medicines you take. How can you care for yourself at home? Read food labels  · Read labels on cans and food packages. The labels tell you how much sodium is in each serving. Make sure that you look at the serving size. If you eat more than the serving size, you have eaten more sodium. · Food labels also tell you the Percent Daily Value for sodium. Choose products with low Percent Daily Values for sodium. · Be aware that sodium can come in forms other than salt, including monosodium glutamate (MSG), sodium citrate, and sodium bicarbonate (baking soda). MSG is often added to Asian food. When you eat out, you can sometimes ask for food without MSG or added salt. Buy low-sodium foods  · Buy foods that are labeled \"unsalted\" (no salt added), \"sodium-free\" (less than 5 mg of sodium per serving), or \"low-sodium\" (140 mg or less of sodium per serving). Foods labeled \"reduced-sodium\" and \"light sodium\" may still have too much sodium. Be sure to read the label to see how much sodium you are getting. · Buy fresh vegetables, or frozen vegetables without added sauces. Buy low-sodium versions of canned vegetables, soups, and other canned goods.   Prepare low-sodium meals  · Cut back on the amount of salt you use in cooking. This will help you adjust to the taste. Do not add salt after cooking. One teaspoon of salt has about 2,300 mg of sodium. · Take the salt shaker off the table. · Flavor your food with garlic, lemon juice, onion, vinegar, herbs, and spices. Do not use soy sauce, lite soy sauce, steak sauce, onion salt, garlic salt, celery salt, or ketchup on your food. · Use low-sodium salad dressings, sauces, and ketchup. Or make your own salad dressings and sauces without adding salt. · Use less salt (or none) when recipes call for it. You can often use half the salt a recipe calls for without losing flavor. Other foods such as rice, pasta, and grains do not need added salt. · Rinse canned vegetables, and cook them in fresh water. This removes some--but not all--of the salt. · Avoid water that is naturally high in sodium or that has been treated with water softeners, which add sodium. If you buy bottled water, read the label and choose a sodium-free brand. Avoid high-sodium foods  · Avoid eating:  ? Smoked, cured, salted, and canned meat, fish, and poultry. ? Ham, blood, hot dogs, and luncheon meats. ? Regular, hard, and processed cheese and regular peanut butter. ? Crackers with salted tops, and other salted snack foods such as pretzels, chips, and salted popcorn. ? Frozen prepared meals, unless labeled low-sodium. ? Canned and dried soups, broths, and bouillon, unless labeled sodium-free or low-sodium. ? Canned vegetables, unless labeled sodium-free or low-sodium. ? [place] fries, pizza, tacos, and other fast foods. ? Pickles, olives, ketchup, and other condiments, especially soy sauce, unless labeled sodium-free or low-sodium. Where can you learn more? Go to https://junior.[employer]. org and sign in to your [facility] account.  Enter E810 in the [facility] box to learn more about \"Low Sodium Diet (2,000 Milligram): Care Instructions. \"     If you do not have an account, please click on the \"Sign Up Now\" link. Current as of: September 8, 2021               Content Version: 13.1  © 0634-5715 [employer], [employer]. Care instructions adapted under license by [facility] ([facility]). If you have questions about a medical condition or this instruction, always ask your healthcare professional. [address] any warranty or liability for your use of this information.

## 2022-03-09 NOTE — PROGRESS NOTES
[facility] PRIMARY CARE  [facility] [address]  [address] [address]  Dept: [phone]  Dept Fax: [phone]    Renetta Alexander is a 43 y.o. female who presentstoday for her medical conditions/complaints as noted below. Renetta Alexander is c/o of  Chief Complaint   Patient presents with   Faye Welch Patient     Establish Care. ER on 3/5/22, HTN         HPI:     Here to establish with new provider, formerly followed with Dr. Mcallister  [facility] of hypertension, formerly on Norvasc but has not been on for several years. Recently went to ED for dizziness and feeling like she was going to pass out. Was found to have significant hypertension at that time. Did have some old Norvasc at home and has been taking that since going to ED, blood pressure is slightly improved from that encounter but remains elevated. She does report that headaches and other symptoms have improved. Denies chest pain, palpitations, leg swelling. Does complain of worsening anxiety and intermittent palpitations associated with episodes of increased stress or overstimulation. Has tried medication for this in past, would like to treat again. Reports that she has for daughters ranging ages 5-2 and she finds herself easily overwhelmed when there is a lot of activity/commotion. Denies significant depression symptoms but does struggle with tearfulness at times.   Denies SI/HI      No results found for: LABA1C          ( goal A1C is < 7)   No results found for: LABMICR  No results found for: LDLCHOLESTEROL, LDLCALC    (goal LDL is <100)   BUN (mg/dL)   Date Value   2022 11     BP Readings from Last 3 Encounters:   22 (!) 144/82   22 (!) 140/91   10/03/19 (!) 140/70          ([id number])    Past Medical History:   Diagnosis Date    Insulin resistance     PCO (polycystic ovaries)       Past Surgical History:   Procedure Laterality Date     SECTION      CHOLECYSTECTOMY      DILATION AND CURETTAGE OF UTERUS      TUBAL LIGATION         Family History   Problem Relation Age of Onset    Diabetes Paternal Grandfather     Hypertension Paternal Grandfather     Thyroid Disease Paternal Grandmother        Social History     Tobacco Use    Smoking status: Never Smoker    Smokeless tobacco: Never Used   Substance Use Topics    Alcohol use: No      Current Outpatient Medications   Medication Sig Dispense Refill    losartan (COZAAR) 25 MG tablet Take 1 tablet by mouth daily 30 tablet 3    sertraline (ZOLOFT) 25 MG tablet Take 1 tablet by mouth daily 30 tablet 3     No current facility-administered medications for this visit. Allergies   Allergen Reactions    Nsaids     Tolmetin Rash and Swelling       Health Maintenance   Topic Date Due    Varicella vaccine (1 of 2 - 2-dose childhood series) Never done    COVID-19 Vaccine (1) Never done    Depression Screen  Never done    HIV screen  Never done    Cervical cancer screen  Never done    Lipid screen  Never done    Flu vaccine (1) 03/09/2023 (Originally 9/1/2021)    Potassium monitoring  03/05/2023    Creatinine monitoring  03/05/2023    DTaP/Tdap/Td vaccine (2 - Td or Tdap) 04/19/2026    Hepatitis A vaccine  Aged Out    Hepatitis B vaccine  Aged Out    Hib vaccine  Aged Out    Meningococcal (ACWY) vaccine  Aged Out    Pneumococcal 0-64 years Vaccine  Aged Out    Hepatitis C screen  Discontinued       Subjective:      Review of Systems   Constitutional: Negative for activity change, fatigue and fever. HENT: Negative for congestion, rhinorrhea and sore throat. Eyes: Negative for visual disturbance. Respiratory: Negative for chest tightness and shortness of breath. Cardiovascular: Positive for palpitations. Negative for chest pain. Gastrointestinal: Negative for abdominal pain, diarrhea, nausea and vomiting. Endocrine: Negative for polydipsia. Genitourinary: Negative for difficulty urinating.    Musculoskeletal: Negative for arthralgias and myalgias. Skin: Negative for color change. Neurological: Positive for headaches. Negative for weakness. Psychiatric/Behavioral: Negative for behavioral problems. The patient is nervous/anxious. Depression/anxiety   All other systems reviewed and are negative. Objective:   BP (!) 144/82   Pulse 98   Resp 18   Ht 5' 4\" (1.626 m)   Wt 253 lb (114.8 kg)   LMP 02/28/2022   SpO2 99%   BMI 43.43 kg/m²   Physical Exam  Vitals reviewed. Constitutional:       General: She is not in acute distress. Appearance: Normal appearance. She is obese. HENT:      Head: Normocephalic. Eyes:      Pupils: Pupils are equal, round, and reactive to light. Cardiovascular:      Rate and Rhythm: Normal rate and regular rhythm. Pulses: Normal pulses. Heart sounds: Normal heart sounds. Pulmonary:      Effort: Pulmonary effort is normal.      Breath sounds: Normal breath sounds. Abdominal:      General: There is no distension. Musculoskeletal:         General: Normal range of motion. Cervical back: Neck supple. Right lower leg: No edema. Left lower leg: No edema. Lymphadenopathy:      Cervical: No cervical adenopathy. Skin:     General: Skin is warm and dry. Capillary Refill: Capillary refill takes less than 2 seconds. Neurological:      General: No focal deficit present. Mental Status: She is alert and oriented to person, place, and time. Psychiatric:         Attention and Perception: Attention and perception normal.         Mood and Affect: Mood and affect normal.         Speech: Speech normal.         Behavior: Behavior normal. Behavior is cooperative. Thought Content: Thought content normal.         Cognition and Memory: Cognition normal.         Judgment: Judgment normal.           :       Diagnosis Orders   1. Essential hypertension  losartan (COZAAR) 25 MG tablet   2. Anxiety  sertraline (ZOLOFT) 25 MG tablet   3.  Palpitation TSH   4. Screening, anemia, deficiency, iron  CBC with Auto Differential   5. Screening for diabetes mellitus  Comprehensive Metabolic Panel   6. Screening for cardiovascular condition  Lipid Panel             :          1. Essential hypertension  Uncontrolled, initiate losartan 25mg daily, low salt diet and exercise. RTO 3 weeks for recheck. - losartan (COZAAR) 25 MG tablet; Take 1 tablet by mouth daily  Dispense: 30 tablet; Refill: 3    2. Anxiety  Worsening, initiate zoloft 25mg daily and continue with meditation, prayer and other outlets to manage stress as has been. - sertraline (ZOLOFT) 25 MG tablet; Take 1 tablet by mouth daily  Dispense: 30 tablet; Refill: 3    3. Palpitation  New, likely related to anxiety but will rule out thyroid dysfunction   - TSH; Future    4. Screening, anemia, deficiency, iron  - CBC with Auto Differential; Future    5. Screening for diabetes mellitus  - Comprehensive Metabolic Panel; Future    6. Screening for cardiovascular condition  - Lipid Panel; Future    Pt is self pay- billed as [id number]  Return in about 3 weeks (around 3/30/2022), or if symptoms worsen or fail to improve, for Hypertension. Patient given educational materials - see patient instructions. Discussed use, benefit, and side effects of prescribed medications. All patient questions answered. Pt voiced understanding. Reviewed health maintenance. Instructed to continue current medications, diet and exercise. Patient agreed with treatment plan. Follow up as directed.        Electronicallysigned by NEYMAR Curry CNP on 3/9/2022 at 1:39 PM

## 2022-07-04 DIAGNOSIS — I10 ESSENTIAL HYPERTENSION: ICD-10-CM

## 2022-07-04 DIAGNOSIS — F41.9 ANXIETY: ICD-10-CM

## 2022-07-05 RX ORDER — LOSARTAN POTASSIUM 25 MG/1
TABLET ORAL
Qty: 90 TABLET | Refills: 1 | Status: SHIPPED | OUTPATIENT
Start: 2022-07-05

## 2022-07-05 RX ORDER — SERTRALINE HYDROCHLORIDE 25 MG/1
TABLET, FILM COATED ORAL
Qty: 90 TABLET | Refills: 1 | Status: SHIPPED | OUTPATIENT
Start: 2022-07-05

## 2023-01-06 DIAGNOSIS — F41.9 ANXIETY: ICD-10-CM

## 2023-01-06 DIAGNOSIS — I10 ESSENTIAL HYPERTENSION: ICD-10-CM

## 2023-01-07 RX ORDER — SERTRALINE HYDROCHLORIDE 25 MG/1
TABLET, FILM COATED ORAL
Qty: 90 TABLET | Refills: 1 | Status: SHIPPED | OUTPATIENT
Start: 2023-01-07

## 2023-01-07 RX ORDER — LOSARTAN POTASSIUM 25 MG/1
TABLET ORAL
Qty: 90 TABLET | Refills: 1 | Status: SHIPPED | OUTPATIENT
Start: 2023-01-07

## 2023-03-02 ENCOUNTER — OFFICE VISIT (OUTPATIENT)
Dept: FAMILY MEDICINE CLINIC | Age: 43
End: 2023-03-02
Payer: COMMERCIAL

## 2023-03-02 VITALS
DIASTOLIC BLOOD PRESSURE: 92 MMHG | SYSTOLIC BLOOD PRESSURE: 148 MMHG | RESPIRATION RATE: 16 BRPM | TEMPERATURE: 98.9 F | HEART RATE: 81 BPM | OXYGEN SATURATION: 98 %

## 2023-03-02 DIAGNOSIS — J02.0 STREP PHARYNGITIS: Primary | ICD-10-CM

## 2023-03-02 DIAGNOSIS — H65.01 NON-RECURRENT ACUTE SEROUS OTITIS MEDIA OF RIGHT EAR: ICD-10-CM

## 2023-03-02 LAB — S PYO AG THROAT QL: POSITIVE

## 2023-03-02 PROCEDURE — 99213 OFFICE O/P EST LOW 20 MIN: CPT | Performed by: REGISTERED NURSE

## 2023-03-02 PROCEDURE — 3079F DIAST BP 80-89 MM HG: CPT | Performed by: REGISTERED NURSE

## 2023-03-02 PROCEDURE — 3077F SYST BP >= 140 MM HG: CPT | Performed by: REGISTERED NURSE

## 2023-03-02 PROCEDURE — 87880 STREP A ASSAY W/OPTIC: CPT | Performed by: REGISTERED NURSE

## 2023-03-02 RX ORDER — AMOXICILLIN AND CLAVULANATE POTASSIUM 875; 125 MG/1; MG/1
1 TABLET, FILM COATED ORAL 2 TIMES DAILY
Qty: 20 TABLET | Refills: 0 | Status: SHIPPED | OUTPATIENT
Start: 2023-03-02 | End: 2023-03-12

## 2023-03-02 SDOH — ECONOMIC STABILITY: FOOD INSECURITY: WITHIN THE PAST 12 MONTHS, THE FOOD YOU BOUGHT JUST DIDN'T LAST AND YOU DIDN'T HAVE MONEY TO GET MORE.: NEVER TRUE

## 2023-03-02 SDOH — ECONOMIC STABILITY: FOOD INSECURITY: WITHIN THE PAST 12 MONTHS, YOU WORRIED THAT YOUR FOOD WOULD RUN OUT BEFORE YOU GOT MONEY TO BUY MORE.: NEVER TRUE

## 2023-03-02 SDOH — ECONOMIC STABILITY: HOUSING INSECURITY
IN THE LAST 12 MONTHS, WAS THERE A TIME WHEN YOU DID NOT HAVE A STEADY PLACE TO SLEEP OR SLEPT IN A SHELTER (INCLUDING NOW)?: NO

## 2023-03-02 SDOH — ECONOMIC STABILITY: INCOME INSECURITY: HOW HARD IS IT FOR YOU TO PAY FOR THE VERY BASICS LIKE FOOD, HOUSING, MEDICAL CARE, AND HEATING?: NOT HARD AT ALL

## 2023-03-02 ASSESSMENT — PATIENT HEALTH QUESTIONNAIRE - PHQ9
2. FEELING DOWN, DEPRESSED OR HOPELESS: 0
SUM OF ALL RESPONSES TO PHQ QUESTIONS 1-9: 0
SUM OF ALL RESPONSES TO PHQ QUESTIONS 1-9: 0
SUM OF ALL RESPONSES TO PHQ9 QUESTIONS 1 & 2: 0
SUM OF ALL RESPONSES TO PHQ QUESTIONS 1-9: 0
SUM OF ALL RESPONSES TO PHQ QUESTIONS 1-9: 0
1. LITTLE INTEREST OR PLEASURE IN DOING THINGS: 0

## 2023-03-02 ASSESSMENT — ENCOUNTER SYMPTOMS
EYES NEGATIVE: 1
SORE THROAT: 1
VOMITING: 0
COUGH: 0
ABDOMINAL PAIN: 0
WHEEZING: 0
NAUSEA: 0

## 2023-03-02 NOTE — PROGRESS NOTES
[facility]  [address] [place]  [address]  [address] [address]  Dept: [phone]  Dept Fax: [phone]    Tavia Boss is a 37 y.o. female who presents today for her medical conditions/complaints of   Chief Complaint   Patient presents with    Pharyngitis     X 2 days, Positive exposure     Ear Fullness     right          HPI:     BP (!) 148/92   Pulse 81   Temp 98.9 °F (37.2 °C)   Resp 16   SpO2 98%       Pharyngitis  This is a new problem. The current episode started yesterday. The problem occurs constantly. Associated symptoms include a sore throat. Pertinent negatives include no abdominal pain, anorexia, chest pain, coughing, fever, nausea or vomiting. Associated symptoms comments: +pressure in the right ear . Nothing aggravates the symptoms. She has tried nothing for the symptoms. Past Medical History:   Diagnosis Date    Insulin resistance     PCO (polycystic ovaries)         Past Surgical History:   Procedure Laterality Date     SECTION      CHOLECYSTECTOMY      DILATION AND CURETTAGE OF UTERUS      TUBAL LIGATION         Family History   Problem Relation Age of Onset    Diabetes Paternal Grandfather     Hypertension Paternal Grandfather     Thyroid Disease Paternal Grandmother        Social History     Tobacco Use    Smoking status: Never    Smokeless tobacco: Never   Substance Use Topics    Alcohol use: No        Prior to Visit Medications    Medication Sig Taking?  Authorizing Provider   amoxicillin-clavulanate (AUGMENTIN) 875-125 MG per tablet Take 1 tablet by mouth 2 times daily for 10 days Yes Devika Murphy, APRN - CNP   sertraline (ZOLOFT) 25 MG tablet Take 1 tablet by mouth once daily Yes Latrice Gaytan APRN - CNP   losartan (COZAAR) 25 MG tablet Take 1 tablet by mouth once daily Yes Maru Infield, APRN - CNP       Allergies   Allergen Reactions    Nsaids     Tolmetin Rash and Swelling Subjective:      Review of Systems   Constitutional:  Negative for fever. HENT:  Positive for ear pain and sore throat. Eyes: Negative. Respiratory:  Negative for cough and wheezing. Cardiovascular:  Negative for chest pain and palpitations. Gastrointestinal:  Negative for abdominal pain, anorexia, nausea and vomiting. Musculoskeletal: Negative. Skin: Negative. Neurological:  Positive for dizziness. Psychiatric/Behavioral: Negative. Objective:     Physical Exam  Constitutional:       General: She is not in acute distress. Appearance: Normal appearance. She is normal weight. She is not ill-appearing, toxic-appearing or diaphoretic. HENT:      Head: Normocephalic. Right Ear: Ear canal and external ear normal. A middle ear effusion is present. Tympanic membrane is not erythematous. Left Ear: Ear canal and external ear normal. Tympanic membrane is not erythematous. Mouth/Throat:      Pharynx: Posterior oropharyngeal erythema present. No oropharyngeal exudate. Cardiovascular:      Rate and Rhythm: Normal rate and regular rhythm. Pulmonary:      Effort: Pulmonary effort is normal.      Breath sounds: Normal breath sounds. Lymphadenopathy:      Cervical: No cervical adenopathy. Skin:     General: Skin is warm. Neurological:      General: No focal deficit present. Mental Status: She is alert and oriented to person, place, and time. Psychiatric:         Mood and Affect: Mood normal.         Behavior: Behavior normal.         MEDICAL DECISION MAKING Assessment/Plan:     Gareth Germain was seen today for pharyngitis and ear fullness. Diagnoses and all orders for this visit:    Strep pharyngitis  -     POCT rapid strep A  -     amoxicillin-clavulanate (AUGMENTIN) 875-125 MG per tablet;  Take 1 tablet by mouth 2 times daily for 10 days    Non-recurrent acute serous otitis media of right ear    Based on the history and exam, positive rapid strep test in the office today, will treat as acute strep throat. Strep Throat:  Strep throat is caused by a bacterial infection that causes severe throat pain, fever and swollen glands in the neck. You will need an antibiotic to get better. It is important that you:  Rest.  Drink plenty of fluids. Please fill and take the antibiotic as directed on the bottle for the full duration that it is prescribed. Even if you are feeling better, please finish the medication. Change your toothbrush in 2 days. You are contagious until you have been on the antibiotic for 24 hours. Salt water gargles (1tsp of table salt dissolved in 8oz of warm water. Gargle with as needed)  Use Cepacol lozenges as directed on the package for pain. You may take Ibuprofen as directed on the bottle for pain, fever or chills. You may take Tylenol as directed on the bottle for pain, fever or chills. Please follow up with urgent care or with your PCP if symptoms not improving. Go to the ED for worsening symptoms, difficutly breathing, difficutly swallowing, drooling, swelling of the neck or tongue, cannot move your neck or have difficulty opening your mouth, or for other emergent concerns. Results for orders placed or performed in visit on 03/02/23   POCT rapid strep A   Result Value Ref Range    Strep A Ag Positive (A) None Detected       Patient counseled:     Patient given educational materials - see patientinstructions. Discussed use, benefit, and side effects of prescribed medications. All patient questions answered. Pt verbalized understanding. Instructed to continue current medications, diet and exercise. Patient agreed with treatment plan. Follow up as directed.      Electronically signed by NEYMAR Johnson CNP on 3/2/2023 at 1:47 PM

## 2023-07-10 DIAGNOSIS — F41.9 ANXIETY: ICD-10-CM

## 2023-07-10 DIAGNOSIS — I10 ESSENTIAL HYPERTENSION: ICD-10-CM

## 2023-07-10 RX ORDER — SERTRALINE HYDROCHLORIDE 25 MG/1
TABLET, FILM COATED ORAL
Qty: 90 TABLET | Refills: 0 | Status: SHIPPED | OUTPATIENT
Start: 2023-07-10

## 2023-07-10 RX ORDER — LOSARTAN POTASSIUM 25 MG/1
TABLET ORAL
Qty: 90 TABLET | Refills: 0 | Status: SHIPPED | OUTPATIENT
Start: 2023-07-10

## 2023-09-11 ENCOUNTER — OFFICE VISIT (OUTPATIENT)
Dept: PRIMARY CARE CLINIC | Age: 43
End: 2023-09-11
Payer: COMMERCIAL

## 2023-09-11 VITALS
OXYGEN SATURATION: 99 % | HEIGHT: 64 IN | BODY MASS INDEX: 44.28 KG/M2 | WEIGHT: 259.4 LBS | RESPIRATION RATE: 18 BRPM | HEART RATE: 93 BPM | SYSTOLIC BLOOD PRESSURE: 136 MMHG | DIASTOLIC BLOOD PRESSURE: 88 MMHG

## 2023-09-11 DIAGNOSIS — E66.9 CLASS 2 OBESITY WITHOUT SERIOUS COMORBIDITY WITH BODY MASS INDEX (BMI) OF 39.0 TO 39.9 IN ADULT, UNSPECIFIED OBESITY TYPE: ICD-10-CM

## 2023-09-11 DIAGNOSIS — Z13.1 SCREENING FOR DIABETES MELLITUS: ICD-10-CM

## 2023-09-11 DIAGNOSIS — Z13.0 SCREENING, ANEMIA, DEFICIENCY, IRON: ICD-10-CM

## 2023-09-11 DIAGNOSIS — E28.2 PCO (POLYCYSTIC OVARIES): ICD-10-CM

## 2023-09-11 DIAGNOSIS — Z13.29 SCREENING FOR THYROID DISORDER: ICD-10-CM

## 2023-09-11 DIAGNOSIS — F41.9 ANXIETY: ICD-10-CM

## 2023-09-11 DIAGNOSIS — E88.81 INSULIN RESISTANCE: ICD-10-CM

## 2023-09-11 DIAGNOSIS — K43.9 VENTRAL HERNIA WITHOUT OBSTRUCTION OR GANGRENE: ICD-10-CM

## 2023-09-11 DIAGNOSIS — I10 ESSENTIAL HYPERTENSION: Primary | ICD-10-CM

## 2023-09-11 DIAGNOSIS — Z12.31 SCREENING MAMMOGRAM FOR BREAST CANCER: ICD-10-CM

## 2023-09-11 DIAGNOSIS — Z13.6 SCREENING FOR CARDIOVASCULAR CONDITION: ICD-10-CM

## 2023-09-11 PROCEDURE — 3075F SYST BP GE 130 - 139MM HG: CPT | Performed by: NURSE PRACTITIONER

## 2023-09-11 PROCEDURE — 99214 OFFICE O/P EST MOD 30 MIN: CPT | Performed by: NURSE PRACTITIONER

## 2023-09-11 PROCEDURE — 3079F DIAST BP 80-89 MM HG: CPT | Performed by: NURSE PRACTITIONER

## 2023-09-11 RX ORDER — FLUOXETINE 10 MG/1
10 CAPSULE ORAL DAILY
Qty: 30 CAPSULE | Refills: 3 | Status: SHIPPED | OUTPATIENT
Start: 2023-09-11

## 2023-09-11 ASSESSMENT — PATIENT HEALTH QUESTIONNAIRE - PHQ9
SUM OF ALL RESPONSES TO PHQ9 QUESTIONS 1 & 2: 0
SUM OF ALL RESPONSES TO PHQ QUESTIONS 1-9: 0
2. FEELING DOWN, DEPRESSED OR HOPELESS: 0
SUM OF ALL RESPONSES TO PHQ QUESTIONS 1-9: 0
1. LITTLE INTEREST OR PLEASURE IN DOING THINGS: 0

## 2023-09-11 ASSESSMENT — ENCOUNTER SYMPTOMS
RHINORRHEA: 0
VOMITING: 0
SORE THROAT: 0
ABDOMINAL PAIN: 1
NAUSEA: 0
DIARRHEA: 0
CHEST TIGHTNESS: 0
SHORTNESS OF BREATH: 0
COLOR CHANGE: 0

## 2023-09-11 NOTE — PROGRESS NOTES
[address]  PRIMARY CARE  [address] [address]  Dept: [phone]  Dept Fax: [phone]    Shilpi Morales is a 37 y.o. female who presentstoday for her medical conditions/complaints as noted below. Shilpi Morales is c/o of  Chief Complaint   Patient presents with    Medication Check         HPI:     Here for routine follow up  Didn't have insurance after last OV so didn't have labs done   PMH significant for HTN, well controlled with losartan 25mg daily  Denies CP, SOB, leg swelling or HA   Axiety stable with zoloft but feels she gained weight after DC of wellbutrin  Notes that symptoms are managed better but has concern for difficulty losing weight  Would like to try alternate medication   Denies depressive symptoms   Hx PCOS, follows with GYN. Due for PAP and mammogram. Has concern for need for hysterectomy due to pelvic pain.  Will continue to monitor     Would like to discuss abdominal hernia, developed during her pregnancies x 4, had 4 c-sections and surgeon told her she has significant scar tissue   Pt c/o mid and lower abdominal pain intermittently with significant core weakness  She has large ventral hernia on exam  Denies bowel or bladder dysfunction        No results found for: \"LABA1C\"          ( goal A1C is < 7)   No components found for: \"LABMICR\"  No results found for: \"LDLCHOLESTEROL\", \"LDLCALC\"    (goal LDL is <100)   BUN (mg/dL)   Date Value   2022 11     BP Readings from Last 3 Encounters:   23 136/88   23 (!) 148/92   22 (!) 144/82          ([id number])    Past Medical History:   Diagnosis Date    Anxiety     Depression     Headache     Hypertension     Insulin resistance     Obesity     PCO (polycystic ovaries)       Past Surgical History:   Procedure Laterality Date     SECTION      CHOLECYSTECTOMY      DILATION AND CURETTAGE OF UTERUS      TUBAL LIGATION         Family History   Problem Relation Age

## 2023-09-14 ENCOUNTER — TELEPHONE (OUTPATIENT)
Dept: PRIMARY CARE CLINIC | Age: 43
End: 2023-09-14

## 2023-09-14 NOTE — TELEPHONE ENCOUNTER
Patient called in stating that she was seen on Monday. She states her anxiety medication was changed from zoloft to prozac. She said she is now having issues with acid reflux. Also having issues with feeling like the pill is stuck in her throat even though she takes the medication with lots of water. She is wanting to know what she should do.

## 2023-09-14 NOTE — TELEPHONE ENCOUNTER
Can discontinue medication and see if symptoms improve. Recommend antacid for reflux symptoms.  When improved, can try alternate medication

## 2023-09-15 NOTE — TELEPHONE ENCOUNTER
Patient notified. She states that she did take the medication early this morning and did not have the same symptoms so she is wondering if the symptoms occurred because of taking the medication at night. She is wanting to know if she should discontinue the medication still.

## 2023-09-30 DIAGNOSIS — I10 ESSENTIAL HYPERTENSION: ICD-10-CM

## 2023-10-02 RX ORDER — LOSARTAN POTASSIUM 25 MG/1
TABLET ORAL
Qty: 90 TABLET | Refills: 1 | Status: SHIPPED | OUTPATIENT
Start: 2023-10-02

## 2024-01-10 DIAGNOSIS — F41.9 ANXIETY: ICD-10-CM

## 2024-01-10 RX ORDER — FLUOXETINE 10 MG/1
10 CAPSULE ORAL DAILY
Qty: 30 CAPSULE | Refills: 2 | Status: SHIPPED | OUTPATIENT
Start: 2024-01-10

## 2024-04-22 DIAGNOSIS — F41.9 ANXIETY: ICD-10-CM

## 2024-04-22 RX ORDER — FLUOXETINE 10 MG/1
10 CAPSULE ORAL DAILY
Qty: 30 CAPSULE | Refills: 0 | Status: SHIPPED | OUTPATIENT
Start: 2024-04-22 | End: 2024-06-11

## 2024-05-22 DIAGNOSIS — I10 ESSENTIAL HYPERTENSION: ICD-10-CM

## 2024-05-22 RX ORDER — LOSARTAN POTASSIUM 25 MG/1
TABLET ORAL
Qty: 90 TABLET | Refills: 1 | Status: SHIPPED | OUTPATIENT
Start: 2024-05-22

## 2024-06-11 DIAGNOSIS — F41.9 ANXIETY: ICD-10-CM

## 2024-06-11 RX ORDER — FLUOXETINE 10 MG/1
10 CAPSULE ORAL DAILY
Qty: 30 CAPSULE | Refills: 0 | Status: SHIPPED | OUTPATIENT
Start: 2024-06-11

## 2024-07-19 DIAGNOSIS — F41.9 ANXIETY: ICD-10-CM

## 2024-07-19 RX ORDER — FLUOXETINE 10 MG/1
10 CAPSULE ORAL DAILY
Qty: 30 CAPSULE | Refills: 0 | Status: SHIPPED | OUTPATIENT
Start: 2024-07-19

## 2024-07-19 NOTE — TELEPHONE ENCOUNTER
Last OV:  09/11/2023    Next OV: Call made to the patient to schedule follow up appointment, writer left a voice message for the patient to contact the office back to scheduled follow up.    Pharmacy:   [facility] [address] [place] [address] [address] [address] [facility] [phone] - F [phone]  [address] [facility] [address]  Phone: [phone] Fax: [phone]       Confirmed? Yes

## 2024-09-01 DIAGNOSIS — F41.9 ANXIETY: ICD-10-CM

## 2024-09-01 DIAGNOSIS — I10 ESSENTIAL HYPERTENSION: ICD-10-CM

## 2024-09-04 RX ORDER — LOSARTAN POTASSIUM 25 MG/1
TABLET ORAL
Qty: 90 TABLET | Refills: 1 | Status: SHIPPED | OUTPATIENT
Start: 2024-09-04

## 2024-09-04 RX ORDER — FLUOXETINE 10 MG/1
10 CAPSULE ORAL DAILY
Qty: 90 CAPSULE | Refills: 1 | Status: SHIPPED | OUTPATIENT
Start: 2024-09-04

## 2025-06-26 DIAGNOSIS — I10 ESSENTIAL HYPERTENSION: ICD-10-CM

## 2025-06-26 RX ORDER — LOSARTAN POTASSIUM 25 MG/1
25 TABLET ORAL DAILY
Qty: 90 TABLET | Refills: 1 | Status: SHIPPED | OUTPATIENT
Start: 2025-06-26